# Patient Record
Sex: MALE | Race: WHITE | Employment: STUDENT | URBAN - METROPOLITAN AREA
[De-identification: names, ages, dates, MRNs, and addresses within clinical notes are randomized per-mention and may not be internally consistent; named-entity substitution may affect disease eponyms.]

---
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
## 2022-01-14 ENCOUNTER — OFFICE VISIT (OUTPATIENT)
Dept: PHYSICAL MEDICINE AND REHAB | Facility: CLINIC | Age: 22
End: 2022-01-14
Payer: COMMERCIAL

## 2022-01-14 VITALS
BODY MASS INDEX: 24.44 KG/M2 | HEIGHT: 69 IN | WEIGHT: 165 LBS | SYSTOLIC BLOOD PRESSURE: 110 MMHG | HEART RATE: 85 BPM | OXYGEN SATURATION: 98 % | DIASTOLIC BLOOD PRESSURE: 58 MMHG

## 2022-01-14 DIAGNOSIS — M22.2X1 RIGHT PATELLOFEMORAL SYNDROME: Primary | ICD-10-CM

## 2022-01-14 PROCEDURE — 3078F DIAST BP <80 MM HG: CPT | Performed by: PHYSICAL MEDICINE & REHABILITATION

## 2022-01-14 PROCEDURE — 3074F SYST BP LT 130 MM HG: CPT | Performed by: PHYSICAL MEDICINE & REHABILITATION

## 2022-01-14 PROCEDURE — 3008F BODY MASS INDEX DOCD: CPT | Performed by: PHYSICAL MEDICINE & REHABILITATION

## 2022-01-14 PROCEDURE — 99204 OFFICE O/P NEW MOD 45 MIN: CPT | Performed by: PHYSICAL MEDICINE & REHABILITATION

## 2022-01-14 NOTE — H&P
History and Physical    C/C: right knee pain    HPI: This is a 78-year-old male who presents with right anterior knee pain that began about 3 weeks ago without inciting events.   He particularly feels increased right anterior knee pain after sitting for abo negative  Lateral joint line tenderness: Negative  Medial patellar facet tenderness: Negative  Lateral patellar facet tenderness: Negative    Provocative tests:  Lachman: Negative  Valgus and varus stress testing: Negative    Imaging: No imaging to review

## 2022-01-26 ENCOUNTER — TELEPHONE (OUTPATIENT)
Dept: PHYSICAL THERAPY | Facility: HOSPITAL | Age: 22
End: 2022-01-26

## 2022-01-27 ENCOUNTER — OFFICE VISIT (OUTPATIENT)
Dept: PHYSICAL THERAPY | Age: 22
End: 2022-01-27
Attending: PHYSICAL MEDICINE & REHABILITATION
Payer: COMMERCIAL

## 2022-01-27 DIAGNOSIS — M22.2X1 RIGHT PATELLOFEMORAL SYNDROME: ICD-10-CM

## 2022-01-27 PROCEDURE — 97161 PT EVAL LOW COMPLEX 20 MIN: CPT

## 2022-01-27 PROCEDURE — 97110 THERAPEUTIC EXERCISES: CPT

## 2022-01-27 NOTE — PROGRESS NOTES
LOWER EXTREMITY EVALUATION:   Referring Physician: Dr. Ian Trejo  Diagnosis:     Right patellofemoral syndrome (M22.2X1)    Date of Service: 1/27/2022     PATIENT SUMMARY   Frankey Pall is a 25year old male who presents to therapy today with complain tests negative for pain. Craigs test + for B hip retroversion. He has decreased hip strength and control at the right hip allowing for excessive hip IR and adduction with knee valgus during double and single leg squatting which reproduces his knee pain.  Anny Hammond was also provided recommendations for correcting excessive R LE ER with gait. Chair transfers were assessed and corrected for excessive hip IR and adduction with knee valgus. Pt performed hip ABD SLR in neutral in side lying 5 reps 3 sets.   HO issued for c

## 2022-02-01 ENCOUNTER — OFFICE VISIT (OUTPATIENT)
Dept: PHYSICAL THERAPY | Age: 22
End: 2022-02-01
Attending: PHYSICAL MEDICINE & REHABILITATION
Payer: COMMERCIAL

## 2022-02-01 PROCEDURE — 97110 THERAPEUTIC EXERCISES: CPT

## 2022-02-03 ENCOUNTER — OFFICE VISIT (OUTPATIENT)
Dept: PHYSICAL THERAPY | Age: 22
End: 2022-02-03
Attending: PHYSICAL MEDICINE & REHABILITATION
Payer: COMMERCIAL

## 2022-02-03 PROCEDURE — 97140 MANUAL THERAPY 1/> REGIONS: CPT

## 2022-02-03 PROCEDURE — 97110 THERAPEUTIC EXERCISES: CPT

## 2022-02-07 ENCOUNTER — OFFICE VISIT (OUTPATIENT)
Dept: PHYSICAL THERAPY | Age: 22
End: 2022-02-07
Attending: PHYSICAL MEDICINE & REHABILITATION
Payer: COMMERCIAL

## 2022-02-07 PROCEDURE — 97110 THERAPEUTIC EXERCISES: CPT

## 2022-02-09 ENCOUNTER — OFFICE VISIT (OUTPATIENT)
Dept: PHYSICAL THERAPY | Age: 22
End: 2022-02-09
Attending: PHYSICAL MEDICINE & REHABILITATION
Payer: COMMERCIAL

## 2022-02-09 PROCEDURE — 97110 THERAPEUTIC EXERCISES: CPT

## 2022-02-15 ENCOUNTER — OFFICE VISIT (OUTPATIENT)
Dept: PHYSICAL THERAPY | Age: 22
End: 2022-02-15
Attending: PHYSICAL MEDICINE & REHABILITATION
Payer: COMMERCIAL

## 2022-02-15 PROCEDURE — 97110 THERAPEUTIC EXERCISES: CPT

## 2022-02-17 ENCOUNTER — OFFICE VISIT (OUTPATIENT)
Dept: PHYSICAL THERAPY | Age: 22
End: 2022-02-17
Attending: PHYSICAL MEDICINE & REHABILITATION
Payer: COMMERCIAL

## 2022-02-17 PROCEDURE — 97110 THERAPEUTIC EXERCISES: CPT

## 2022-02-18 ENCOUNTER — MED REC SCAN ONLY (OUTPATIENT)
Dept: PHYSICAL MEDICINE AND REHAB | Facility: CLINIC | Age: 22
End: 2022-02-18

## 2022-02-21 ENCOUNTER — OFFICE VISIT (OUTPATIENT)
Dept: PHYSICAL THERAPY | Age: 22
End: 2022-02-21
Attending: PHYSICAL MEDICINE & REHABILITATION
Payer: COMMERCIAL

## 2022-02-21 PROCEDURE — 97110 THERAPEUTIC EXERCISES: CPT

## 2022-03-01 ENCOUNTER — LAB ENCOUNTER (OUTPATIENT)
Dept: LAB | Age: 22
End: 2022-03-01
Attending: FAMILY MEDICINE
Payer: COMMERCIAL

## 2022-03-01 ENCOUNTER — OFFICE VISIT (OUTPATIENT)
Dept: FAMILY MEDICINE CLINIC | Facility: CLINIC | Age: 22
End: 2022-03-01
Payer: COMMERCIAL

## 2022-03-01 VITALS
WEIGHT: 170 LBS | RESPIRATION RATE: 18 BRPM | HEIGHT: 69 IN | HEART RATE: 65 BPM | OXYGEN SATURATION: 97 % | DIASTOLIC BLOOD PRESSURE: 66 MMHG | BODY MASS INDEX: 25.18 KG/M2 | SYSTOLIC BLOOD PRESSURE: 98 MMHG

## 2022-03-01 DIAGNOSIS — Z00.00 ROUTINE GENERAL MEDICAL EXAMINATION AT A HEALTH CARE FACILITY: ICD-10-CM

## 2022-03-01 DIAGNOSIS — Z00.00 ROUTINE GENERAL MEDICAL EXAMINATION AT A HEALTH CARE FACILITY: Primary | ICD-10-CM

## 2022-03-01 PROBLEM — K52.9 GASTROENTERITIS: Status: RESOLVED | Noted: 2019-05-09 | Resolved: 2022-03-01

## 2022-03-01 PROBLEM — L70.9 ACNE: Status: RESOLVED | Noted: 2017-12-06 | Resolved: 2022-03-01

## 2022-03-01 PROBLEM — F33.2 SEVERE EPISODE OF RECURRENT MAJOR DEPRESSIVE DISORDER, WITHOUT PSYCHOTIC FEATURES (HCC): Status: RESOLVED | Noted: 2017-12-05 | Resolved: 2022-03-01

## 2022-03-01 LAB
ALBUMIN SERPL-MCNC: 4.4 G/DL (ref 3.4–5)
ALBUMIN/GLOB SERPL: 1.6 {RATIO} (ref 1–2)
ALP LIVER SERPL-CCNC: 43 U/L
ALT SERPL-CCNC: 27 U/L
ANION GAP SERPL CALC-SCNC: 4 MMOL/L (ref 0–18)
AST SERPL-CCNC: 13 U/L (ref 15–37)
BILIRUB SERPL-MCNC: 0.4 MG/DL (ref 0.1–2)
BUN BLD-MCNC: 14 MG/DL (ref 7–18)
CALCIUM BLD-MCNC: 9.3 MG/DL (ref 8.5–10.1)
CHLORIDE SERPL-SCNC: 105 MMOL/L (ref 98–112)
CHOLEST SERPL-MCNC: 187 MG/DL (ref ?–200)
CO2 SERPL-SCNC: 30 MMOL/L (ref 21–32)
CREAT BLD-MCNC: 0.86 MG/DL
EST. AVERAGE GLUCOSE BLD GHB EST-MCNC: 100 MG/DL (ref 68–126)
FASTING PATIENT LIPID ANSWER: YES
FASTING STATUS PATIENT QL REPORTED: YES
GLOBULIN PLAS-MCNC: 2.8 G/DL (ref 2.8–4.4)
GLUCOSE BLD-MCNC: 73 MG/DL (ref 70–99)
HBA1C MFR BLD: 5.1 % (ref ?–5.7)
HDLC SERPL-MCNC: 54 MG/DL (ref 40–59)
LDLC SERPL CALC-MCNC: 106 MG/DL (ref ?–100)
NONHDLC SERPL-MCNC: 133 MG/DL (ref ?–130)
OSMOLALITY SERPL CALC.SUM OF ELEC: 287 MOSM/KG (ref 275–295)
POTASSIUM SERPL-SCNC: 4.2 MMOL/L (ref 3.5–5.1)
PROT SERPL-MCNC: 7.2 G/DL (ref 6.4–8.2)
SODIUM SERPL-SCNC: 139 MMOL/L (ref 136–145)
TRIGL SERPL-MCNC: 156 MG/DL (ref 30–149)
VIT B12 SERPL-MCNC: 728 PG/ML (ref 193–986)
VLDLC SERPL CALC-MCNC: 26 MG/DL (ref 0–30)

## 2022-03-01 PROCEDURE — 3074F SYST BP LT 130 MM HG: CPT | Performed by: FAMILY MEDICINE

## 2022-03-01 PROCEDURE — 80061 LIPID PANEL: CPT | Performed by: FAMILY MEDICINE

## 2022-03-01 PROCEDURE — 3008F BODY MASS INDEX DOCD: CPT | Performed by: FAMILY MEDICINE

## 2022-03-01 PROCEDURE — 80053 COMPREHEN METABOLIC PANEL: CPT | Performed by: FAMILY MEDICINE

## 2022-03-01 PROCEDURE — 82607 VITAMIN B-12: CPT | Performed by: FAMILY MEDICINE

## 2022-03-01 PROCEDURE — 99395 PREV VISIT EST AGE 18-39: CPT | Performed by: FAMILY MEDICINE

## 2022-03-01 PROCEDURE — 90715 TDAP VACCINE 7 YRS/> IM: CPT | Performed by: FAMILY MEDICINE

## 2022-03-01 PROCEDURE — 83036 HEMOGLOBIN GLYCOSYLATED A1C: CPT | Performed by: FAMILY MEDICINE

## 2022-03-01 PROCEDURE — 82306 VITAMIN D 25 HYDROXY: CPT | Performed by: FAMILY MEDICINE

## 2022-03-01 PROCEDURE — 3078F DIAST BP <80 MM HG: CPT | Performed by: FAMILY MEDICINE

## 2022-03-01 PROCEDURE — 90471 IMMUNIZATION ADMIN: CPT | Performed by: FAMILY MEDICINE

## 2022-03-01 NOTE — PATIENT INSTRUCTIONS
Patient information for tdap vaccine:  Tetanus bacteria causes lockjaw. You may be exposed by something terell entering the skin or soil entering the skin. Rare in the [place], 10-20 cases per year. Vaccine may still be effective up to 72 hours after an exposure. Diphtheria causes a sore throat with white spots similar to strep. Extremely rare in the [place] but you may be exposed if you travel over the borders. Pertussis or whooping cough is most dangerous to infants and toddlers. Still see 15,000 to 20,000 cases in the [place] annually. In adults this may cause a cough lingering up to 100 days, contagious the first few weeks. Antibiotics useful in children but not effective in adults. One vaccine covers these 3 illnesses and is good for 10 years. You are due in APril 2022, okay to dose anytime. Discussed self testicular exam to be done monthly. Highest risk of testicular cancer ages 25 to 27, but may occur ages 15 to 36. Report any changes such as hard nodules like a pebble on the testicle to us.

## 2022-03-02 LAB — VIT D+METAB SERPL-MCNC: 28.2 NG/ML (ref 30–100)

## 2022-03-03 ENCOUNTER — OFFICE VISIT (OUTPATIENT)
Dept: PHYSICAL THERAPY | Age: 22
End: 2022-03-03
Attending: PHYSICAL MEDICINE & REHABILITATION
Payer: COMMERCIAL

## 2022-03-03 PROCEDURE — 97110 THERAPEUTIC EXERCISES: CPT

## 2022-03-10 ENCOUNTER — OFFICE VISIT (OUTPATIENT)
Dept: PHYSICAL THERAPY | Age: 22
End: 2022-03-10
Attending: PHYSICAL MEDICINE & REHABILITATION
Payer: COMMERCIAL

## 2022-03-10 PROCEDURE — 97110 THERAPEUTIC EXERCISES: CPT

## 2022-03-17 ENCOUNTER — OFFICE VISIT (OUTPATIENT)
Dept: PHYSICAL THERAPY | Age: 22
End: 2022-03-17
Attending: PHYSICAL MEDICINE & REHABILITATION
Payer: COMMERCIAL

## 2022-03-17 PROCEDURE — 97110 THERAPEUTIC EXERCISES: CPT

## 2022-05-12 ENCOUNTER — LAB ENCOUNTER (OUTPATIENT)
Dept: LAB | Age: 22
End: 2022-05-12
Attending: ALLERGY & IMMUNOLOGY
Payer: COMMERCIAL

## 2022-05-12 DIAGNOSIS — L50.1 IDIOPATHIC URTICARIA: ICD-10-CM

## 2022-05-12 DIAGNOSIS — L50.3 DERMATOGRAPHIC URTICARIA: Primary | ICD-10-CM

## 2022-05-12 LAB
BASOPHILS # BLD AUTO: 0.06 X10(3) UL (ref 0–0.2)
BASOPHILS NFR BLD AUTO: 0.9 %
EOSINOPHIL # BLD AUTO: 0.32 X10(3) UL (ref 0–0.7)
EOSINOPHIL NFR BLD AUTO: 4.7 %
ERYTHROCYTE [DISTWIDTH] IN BLOOD BY AUTOMATED COUNT: 11.8 %
ERYTHROCYTE [SEDIMENTATION RATE] IN BLOOD: 5 MM/HR
HCT VFR BLD AUTO: 42.6 %
HGB BLD-MCNC: 14 G/DL
IGE SERPL-ACNC: 33.5 IU/ML (ref 3.6–114)
IMM GRANULOCYTES # BLD AUTO: 0.02 X10(3) UL (ref 0–1)
IMM GRANULOCYTES NFR BLD: 0.3 %
LYMPHOCYTES # BLD AUTO: 1.85 X10(3) UL (ref 1–4)
LYMPHOCYTES NFR BLD AUTO: 27.1 %
MCH RBC QN AUTO: 29.1 PG (ref 26–34)
MCHC RBC AUTO-ENTMCNC: 32.9 G/DL (ref 31–37)
MCV RBC AUTO: 88.6 FL
MONOCYTES # BLD AUTO: 0.55 X10(3) UL (ref 0.1–1)
MONOCYTES NFR BLD AUTO: 8.1 %
NEUTROPHILS # BLD AUTO: 4.03 X10 (3) UL (ref 1.5–7.7)
NEUTROPHILS # BLD AUTO: 4.03 X10(3) UL (ref 1.5–7.7)
NEUTROPHILS NFR BLD AUTO: 58.9 %
PLATELET # BLD AUTO: 317 10(3)UL (ref 150–450)
RBC # BLD AUTO: 4.81 X10(6)UL
WBC # BLD AUTO: 6.8 X10(3) UL (ref 4–11)

## 2022-05-12 PROCEDURE — 86003 ALLG SPEC IGE CRUDE XTRC EA: CPT

## 2022-05-12 PROCEDURE — 36415 COLL VENOUS BLD VENIPUNCTURE: CPT

## 2022-05-12 PROCEDURE — 85025 COMPLETE CBC W/AUTO DIFF WBC: CPT

## 2022-05-12 PROCEDURE — 82785 ASSAY OF IGE: CPT

## 2022-05-12 PROCEDURE — 85652 RBC SED RATE AUTOMATED: CPT

## 2022-05-15 LAB
ALLERGEN, AVOCADO IGE: <0.1 KU/L
ALLERGEN, MACADAMIA NUT IGE: <0.1 KU/L
ALLERGEN, PISTACHIO IGE: <0.1 KU/L
Lab: <0.1 KU/L

## 2022-05-16 LAB
ALMOND IGE QN: 0.17 KUA/L (ref ?–0.1)
CASHEW NUT IGE QN: <0.1 KUA/L (ref ?–0.1)
CORN IGE QN: 0.13 KUA/L (ref ?–0.1)
COW MILK IGE QN: <0.1 KUA/L (ref ?–0.1)
CRAB IGE QN: <0.1 KUA/L (ref ?–0.1)
EGG WHITE IGE QN: <0.1 KUA/L (ref ?–0.1)
EGG YOLK IGE QN: <0.1 KUA/L (ref ?–0.1)
GLUTEN IGE QN: <0.1 KUA/L (ref ?–0.1)
HAZELNUT IGE QN: 2.28 KUA/L (ref ?–0.1)
LOBSTER IGE QN: <0.1 KUA/L (ref ?–0.1)
PEANUT IGE QN: 0.21 KUA/L (ref ?–0.1)
PECAN/HICK NUT IGE QN: <0.1 KUA/L (ref ?–0.1)
SESAME SEED IGE QN: 0.59 KUA/L (ref ?–0.1)
SHRIMP IGE QN: <0.1 KUA/L (ref ?–0.1)
SOYBEAN IGE QN: 0.1 KUA/L (ref ?–0.1)
WALNUT IGE QN: <0.1 KUA/L (ref ?–0.1)
WHEAT IGE QN: 0.15 KUA/L (ref ?–0.1)

## 2022-10-29 ENCOUNTER — APPOINTMENT (OUTPATIENT)
Dept: ULTRASOUND IMAGING | Facility: HOSPITAL | Age: 22
End: 2022-10-29
Attending: EMERGENCY MEDICINE
Payer: COMMERCIAL

## 2022-10-29 ENCOUNTER — HOSPITAL ENCOUNTER (EMERGENCY)
Facility: HOSPITAL | Age: 22
Discharge: HOME | End: 2022-10-29
Attending: EMERGENCY MEDICINE
Payer: COMMERCIAL

## 2022-10-29 VITALS
SYSTOLIC BLOOD PRESSURE: 112 MMHG | BODY MASS INDEX: 25.77 KG/M2 | RESPIRATION RATE: 17 BRPM | OXYGEN SATURATION: 98 % | WEIGHT: 180 LBS | TEMPERATURE: 99 F | HEIGHT: 70 IN | DIASTOLIC BLOOD PRESSURE: 65 MMHG | HEART RATE: 68 BPM

## 2022-10-29 DIAGNOSIS — N50.812 TESTICULAR PAIN, LEFT: Primary | ICD-10-CM

## 2022-10-29 LAB
BILIRUB UR QL STRIP.AUTO: NEGATIVE
CLARITY UR REFRACT.AUTO: CLEAR
GLUCOSE UR STRIP.AUTO-MCNC: NEGATIVE MG/DL
KETONES UR STRIP.AUTO-MCNC: NEGATIVE MG/DL
LEUKOCYTE ESTERASE UR QL STRIP.AUTO: NEGATIVE
NITRITE UR QL STRIP.AUTO: NEGATIVE
PH UR STRIP.AUTO: 7 [PH] (ref 5–8)
PROT UR STRIP.AUTO-MCNC: NEGATIVE MG/DL
RBC UR QL AUTO: NEGATIVE
SP GR UR STRIP.AUTO: 1.01 (ref 1–1.03)
UROBILINOGEN UR STRIP.AUTO-MCNC: <2 MG/DL

## 2022-10-29 PROCEDURE — 99284 EMERGENCY DEPT VISIT MOD MDM: CPT

## 2022-10-29 PROCEDURE — 81003 URINALYSIS AUTO W/O SCOPE: CPT | Performed by: EMERGENCY MEDICINE

## 2022-10-29 PROCEDURE — 93975 VASCULAR STUDY: CPT | Performed by: EMERGENCY MEDICINE

## 2022-10-29 PROCEDURE — 76870 US EXAM SCROTUM: CPT | Performed by: EMERGENCY MEDICINE

## 2023-02-12 ENCOUNTER — TELEMEDICINE (OUTPATIENT)
Dept: TELEHEALTH | Age: 23
End: 2023-02-12

## 2023-02-12 DIAGNOSIS — U07.1 COVID-19 VIRUS INFECTION: ICD-10-CM

## 2023-02-12 DIAGNOSIS — H10.33 ACUTE CONJUNCTIVITIS OF BOTH EYES, UNSPECIFIED ACUTE CONJUNCTIVITIS TYPE: ICD-10-CM

## 2023-02-12 DIAGNOSIS — J01.90 ACUTE RHINOSINUSITIS: Primary | ICD-10-CM

## 2023-02-12 PROCEDURE — 99213 OFFICE O/P EST LOW 20 MIN: CPT | Performed by: NURSE PRACTITIONER

## 2023-02-12 RX ORDER — AMOXICILLIN AND CLAVULANATE POTASSIUM 875; 125 MG/1; MG/1
1 TABLET, FILM COATED ORAL 2 TIMES DAILY WITH MEALS
Qty: 14 TABLET | Refills: 0 | Status: SHIPPED | OUTPATIENT
Start: 2023-02-12 | End: 2023-02-19

## 2023-02-12 RX ORDER — ALBUTEROL SULFATE 90 UG/1
2 AEROSOL, METERED RESPIRATORY (INHALATION)
Qty: 1 EACH | Refills: 0 | Status: SHIPPED | OUTPATIENT
Start: 2023-02-12

## 2023-02-12 RX ORDER — DOXEPIN HYDROCHLORIDE 25 MG/1
1 CAPSULE ORAL NIGHTLY PRN
COMMUNITY
Start: 2022-12-11

## 2023-02-12 RX ORDER — OFLOXACIN 3 MG/ML
SOLUTION/ DROPS OPHTHALMIC
Qty: 1 EACH | Refills: 0 | Status: SHIPPED | OUTPATIENT
Start: 2023-02-12

## 2023-03-30 ENCOUNTER — OFFICE VISIT (OUTPATIENT)
Dept: FAMILY MEDICINE CLINIC | Facility: CLINIC | Age: 23
End: 2023-03-30
Payer: COMMERCIAL

## 2023-03-30 VITALS
OXYGEN SATURATION: 99 % | HEIGHT: 70 IN | HEART RATE: 60 BPM | TEMPERATURE: 99 F | WEIGHT: 186 LBS | DIASTOLIC BLOOD PRESSURE: 60 MMHG | BODY MASS INDEX: 26.63 KG/M2 | RESPIRATION RATE: 16 BRPM | SYSTOLIC BLOOD PRESSURE: 102 MMHG

## 2023-03-30 DIAGNOSIS — Z86.16 HISTORY OF COVID-19: ICD-10-CM

## 2023-03-30 DIAGNOSIS — R05.9 COUGH IN ADULT: Primary | ICD-10-CM

## 2023-03-30 DIAGNOSIS — R09.89 ERYTHEMATOUS NASAL MUCOSA: ICD-10-CM

## 2023-03-30 PROCEDURE — 3008F BODY MASS INDEX DOCD: CPT | Performed by: NURSE PRACTITIONER

## 2023-03-30 PROCEDURE — 3078F DIAST BP <80 MM HG: CPT | Performed by: NURSE PRACTITIONER

## 2023-03-30 PROCEDURE — 3074F SYST BP LT 130 MM HG: CPT | Performed by: NURSE PRACTITIONER

## 2023-03-30 PROCEDURE — 99213 OFFICE O/P EST LOW 20 MIN: CPT | Performed by: NURSE PRACTITIONER

## 2023-03-30 RX ORDER — TRETINOIN 1 MG/G
CREAM TOPICAL
COMMUNITY
Start: 2022-10-30

## 2023-03-30 RX ORDER — FLUTICASONE PROPIONATE 50 MCG
1 SPRAY, SUSPENSION (ML) NASAL 2 TIMES DAILY
Qty: 1 EACH | Refills: 2 | Status: SHIPPED | OUTPATIENT
Start: 2023-03-30 | End: 2023-04-29

## 2023-03-30 RX ORDER — CLINDAMYCIN PHOSPHATE 10 UG/ML
1 LOTION TOPICAL EVERY MORNING
COMMUNITY
Start: 2022-12-08

## 2023-03-30 RX ORDER — AZELAIC ACID 0.15 G/G
1 GEL TOPICAL 2 TIMES DAILY
COMMUNITY
Start: 2022-11-25

## 2023-03-30 RX ORDER — CETIRIZINE HYDROCHLORIDE 10 MG/1
10 TABLET ORAL DAILY
COMMUNITY

## 2023-03-30 NOTE — PATIENT INSTRUCTIONS
Please start Flonase 1 spray each nostril twice daily before brushing teeth. Use for at least one to two weeks. To switch off Zyrtec-take every other day alternating with Claritin or Allegra for one week, then take every third day alternating with medication for one week, then take every 4th day alternating with medication for one week, continue until only taking Claritin or Allegra. If itchy may take lukewarm shower or put cold pack on particularly itchy area. Itching will subside once Zyrtec is out of your system.

## 2023-05-12 ENCOUNTER — OFFICE VISIT (OUTPATIENT)
Dept: FAMILY MEDICINE CLINIC | Facility: CLINIC | Age: 23
End: 2023-05-12
Payer: COMMERCIAL

## 2023-05-12 ENCOUNTER — LAB ENCOUNTER (OUTPATIENT)
Dept: LAB | Age: 23
End: 2023-05-12
Attending: FAMILY MEDICINE
Payer: COMMERCIAL

## 2023-05-12 VITALS
SYSTOLIC BLOOD PRESSURE: 102 MMHG | HEIGHT: 70 IN | BODY MASS INDEX: 24.48 KG/M2 | OXYGEN SATURATION: 96 % | WEIGHT: 171 LBS | HEART RATE: 70 BPM | DIASTOLIC BLOOD PRESSURE: 52 MMHG

## 2023-05-12 DIAGNOSIS — Z00.00 ROUTINE GENERAL MEDICAL EXAMINATION AT A HEALTH CARE FACILITY: ICD-10-CM

## 2023-05-12 DIAGNOSIS — L90.6: ICD-10-CM

## 2023-05-12 DIAGNOSIS — G44.84 PRIMARY EXERTIONAL HEADACHE: ICD-10-CM

## 2023-05-12 DIAGNOSIS — Z00.00 ROUTINE GENERAL MEDICAL EXAMINATION AT A HEALTH CARE FACILITY: Primary | ICD-10-CM

## 2023-05-12 LAB
ALBUMIN SERPL-MCNC: 4.4 G/DL (ref 3.4–5)
ALBUMIN/GLOB SERPL: 1.4 {RATIO} (ref 1–2)
ALP LIVER SERPL-CCNC: 51 U/L
ALT SERPL-CCNC: 25 U/L
ANION GAP SERPL CALC-SCNC: 1 MMOL/L (ref 0–18)
AST SERPL-CCNC: 19 U/L (ref 15–37)
BASOPHILS # BLD AUTO: 0.04 X10(3) UL (ref 0–0.2)
BASOPHILS NFR BLD AUTO: 0.8 %
BILIRUB SERPL-MCNC: 0.4 MG/DL (ref 0.1–2)
BUN BLD-MCNC: 16 MG/DL (ref 7–18)
CALCIUM BLD-MCNC: 9.7 MG/DL (ref 8.5–10.1)
CHLORIDE SERPL-SCNC: 105 MMOL/L (ref 98–112)
CHOLEST SERPL-MCNC: 176 MG/DL (ref ?–200)
CO2 SERPL-SCNC: 30 MMOL/L (ref 21–32)
CREAT BLD-MCNC: 1.14 MG/DL
EOSINOPHIL # BLD AUTO: 0.15 X10(3) UL (ref 0–0.7)
EOSINOPHIL NFR BLD AUTO: 3 %
ERYTHROCYTE [DISTWIDTH] IN BLOOD BY AUTOMATED COUNT: 11.9 %
FASTING PATIENT LIPID ANSWER: YES
FASTING STATUS PATIENT QL REPORTED: YES
GFR SERPLBLD BASED ON 1.73 SQ M-ARVRAT: 93 ML/MIN/1.73M2 (ref 60–?)
GLOBULIN PLAS-MCNC: 3.1 G/DL (ref 2.8–4.4)
GLUCOSE BLD-MCNC: 85 MG/DL (ref 70–99)
HCT VFR BLD AUTO: 44.5 %
HDLC SERPL-MCNC: 61 MG/DL (ref 40–59)
HGB BLD-MCNC: 14.9 G/DL
IMM GRANULOCYTES # BLD AUTO: 0.01 X10(3) UL (ref 0–1)
IMM GRANULOCYTES NFR BLD: 0.2 %
LDLC SERPL CALC-MCNC: 102 MG/DL (ref ?–100)
LYMPHOCYTES # BLD AUTO: 1.93 X10(3) UL (ref 1–4)
LYMPHOCYTES NFR BLD AUTO: 38.3 %
MCH RBC QN AUTO: 28.7 PG (ref 26–34)
MCHC RBC AUTO-ENTMCNC: 33.5 G/DL (ref 31–37)
MCV RBC AUTO: 85.7 FL
MONOCYTES # BLD AUTO: 0.48 X10(3) UL (ref 0.1–1)
MONOCYTES NFR BLD AUTO: 9.5 %
NEUTROPHILS # BLD AUTO: 2.43 X10 (3) UL (ref 1.5–7.7)
NEUTROPHILS # BLD AUTO: 2.43 X10(3) UL (ref 1.5–7.7)
NEUTROPHILS NFR BLD AUTO: 48.2 %
NONHDLC SERPL-MCNC: 115 MG/DL (ref ?–130)
OSMOLALITY SERPL CALC.SUM OF ELEC: 282 MOSM/KG (ref 275–295)
PLATELET # BLD AUTO: 309 10(3)UL (ref 150–450)
POTASSIUM SERPL-SCNC: 3.8 MMOL/L (ref 3.5–5.1)
PROT SERPL-MCNC: 7.5 G/DL (ref 6.4–8.2)
RBC # BLD AUTO: 5.19 X10(6)UL
SODIUM SERPL-SCNC: 136 MMOL/L (ref 136–145)
TRIGL SERPL-MCNC: 67 MG/DL (ref 30–149)
VLDLC SERPL CALC-MCNC: 11 MG/DL (ref 0–30)
WBC # BLD AUTO: 5 X10(3) UL (ref 4–11)

## 2023-05-12 PROCEDURE — 3008F BODY MASS INDEX DOCD: CPT | Performed by: FAMILY MEDICINE

## 2023-05-12 PROCEDURE — 99395 PREV VISIT EST AGE 18-39: CPT | Performed by: FAMILY MEDICINE

## 2023-05-12 PROCEDURE — 3078F DIAST BP <80 MM HG: CPT | Performed by: FAMILY MEDICINE

## 2023-05-12 PROCEDURE — 80061 LIPID PANEL: CPT | Performed by: FAMILY MEDICINE

## 2023-05-12 PROCEDURE — 85025 COMPLETE CBC W/AUTO DIFF WBC: CPT | Performed by: FAMILY MEDICINE

## 2023-05-12 PROCEDURE — 80053 COMPREHEN METABOLIC PANEL: CPT | Performed by: FAMILY MEDICINE

## 2023-05-12 PROCEDURE — 3074F SYST BP LT 130 MM HG: CPT | Performed by: FAMILY MEDICINE

## 2023-05-12 RX ORDER — CITALOPRAM 10 MG/1
10 TABLET ORAL DAILY
COMMUNITY

## 2023-05-12 NOTE — PATIENT INSTRUCTIONS
VItamin E lotion with aloe for striae      Some studies suggest that certain supplements can help prevent exertional headaches, such as:    Coenzyme Q10. Feverfew. Magnesium. Riboflavin (vitamin B2). Boswellia (a natural anti-inflammatory if indomethacin is not well-tolerated).

## 2024-03-28 ENCOUNTER — OFFICE VISIT (OUTPATIENT)
Dept: FAMILY MEDICINE CLINIC | Facility: CLINIC | Age: 24
End: 2024-03-28
Payer: COMMERCIAL

## 2024-03-28 VITALS
BODY MASS INDEX: 24.91 KG/M2 | WEIGHT: 174 LBS | HEIGHT: 70 IN | SYSTOLIC BLOOD PRESSURE: 118 MMHG | TEMPERATURE: 98 F | DIASTOLIC BLOOD PRESSURE: 56 MMHG | OXYGEN SATURATION: 100 % | HEART RATE: 89 BPM | RESPIRATION RATE: 16 BRPM

## 2024-03-28 DIAGNOSIS — J00 NASOPHARYNGITIS: Primary | ICD-10-CM

## 2024-03-28 DIAGNOSIS — J34.89 NASAL SORE: ICD-10-CM

## 2024-03-28 LAB
CONTROL LINE PRESENT WITH A CLEAR BACKGROUND (YES/NO): YES YES/NO
STREP GRP A CUL-SCR: NEGATIVE

## 2024-03-28 PROCEDURE — 3078F DIAST BP <80 MM HG: CPT | Performed by: NURSE PRACTITIONER

## 2024-03-28 PROCEDURE — 87635 SARS-COV-2 COVID-19 AMP PRB: CPT | Performed by: NURSE PRACTITIONER

## 2024-03-28 PROCEDURE — 3074F SYST BP LT 130 MM HG: CPT | Performed by: NURSE PRACTITIONER

## 2024-03-28 PROCEDURE — 87880 STREP A ASSAY W/OPTIC: CPT | Performed by: NURSE PRACTITIONER

## 2024-03-28 PROCEDURE — 3008F BODY MASS INDEX DOCD: CPT | Performed by: NURSE PRACTITIONER

## 2024-03-28 PROCEDURE — 99213 OFFICE O/P EST LOW 20 MIN: CPT | Performed by: NURSE PRACTITIONER

## 2024-03-28 NOTE — PROGRESS NOTES
CHIEF COMPLAINT:     Chief Complaint   Patient presents with    Sore Throat     Sore throat last night, nasal congestion x 6 days ago had a lump in his neck        HPI:   Wil Hager is a 24 year old male presents to clinic with complaint of sore throat, drainage- nasal/post nasal, scabbing/tenderness in nostrils, lymph node in right neck, loose stools.  Drainage is clear.  Denies fever, body aches, chills, cough, dyspnea, SOB, chest pain, abdominal pain, N/V, ear pain, or rashes.  Tolerating PO.  No known ill contacts.  Denies recurrent strep, hx of tonsillectomy.      Current Outpatient Medications   Medication Sig Dispense Refill    NON FORMULARY Takes an antihistamine daily      mupirocin 2 % External Ointment Apply to affected area of nose BID-TID for 7 days 22 g 0    doxepin 25 MG Oral Cap Take 1 capsule (25 mg total) by mouth nightly as needed.      Ziprasidone HCl 40 MG Oral Cap Take 1 capsule (40 mg total) by mouth daily.      melatonin 3 MG Oral Tab Take nightly 30 tablet 0    Multiple Vitamin (MULTI-VITAMIN OR) Take  by mouth.      citalopram 10 MG Oral Tab Take 1 tablet (10 mg total) by mouth daily. (Patient not taking: Reported on 3/28/2024)      Tretinoin 0.1 % External Cream APPLY TO FACE NIGHTLY AT BEDDTIME, START EVERY OTHER NIGHT, THEN NIGHTLY AS TOLERATED (Patient not taking: Reported on 3/28/2024)      clindamycin 1 % External Lotion Apply 1 Application topically every morning. (Patient not taking: Reported on 3/28/2024)      albuterol 108 (90 Base) MCG/ACT Inhalation Aero Soln Inhale 2 puffs into the lungs every 4 to 6 hours as needed for Wheezing or Shortness of Breath. (Patient not taking: Reported on 3/28/2024) 1 each 0      Past Medical History:   Diagnosis Date    Acne 12/6/2017    Anxiety     Depression     Headache disorder       Social History:  Social History     Socioeconomic History    Marital status: Single   Tobacco Use    Smoking status: Never    Smokeless tobacco: Never     Tobacco comments:     vaping   Vaping Use    Vaping Use: Some days   Substance and Sexual Activity    Alcohol use: Not Currently     Alcohol/week: 0.0 standard drinks of alcohol     Comment: reports not drinking lately    Drug use: Not Currently     Comment: 10/15- Donovan states that he smokes 1-2 grams Cannabis 4-5 x a week x 2 years. Last use 10 days ago.PT unable to remember when his last use was, possibly 6 days ago . Thinks it may have been laced.        REVIEW OF SYSTEMS:   GENERAL HEALTH: feels well otherwise, normal appetite  SKIN: denies any unusual skin lesions or rashes  HEENT: denies ear pain or difficulty swallowing/eating. See HPI  RESPIRATORY: denies shortness of breath or wheezing  CARDIOVASCULAR: denies chest pain or palpitations   GI: denies vomiting or diarrhea  NEURO: denies dizziness or lightheadedness    EXAM:   /56   Pulse 89   Temp 98.3 °F (36.8 °C) (Oral)   Resp 16   Ht 5' 10\" (1.778 m)   Wt 174 lb (78.9 kg)   SpO2 100%   BMI 24.97 kg/m²   GENERAL: well developed, well nourished, in no apparent distress  SKIN: no rashes, no suspicious lesions  HEAD: atraumatic, normocephalic  EYES: conjunctiva clear, EOM intact  EARS: TM's clear, non-injected, no bulging, retraction, or fluid bilaterally  NOSE: nostrils patent, +clear drainage, nasal mucosa pink and +inflamed.  +Scabbed sore to left nare tip, tender.  THROAT: oral mucosa pink, moist. +Posterior pharynx erythematous and injected. No exudates. Tonsils- absent.  NECK: supple, non-tender  LUNGS: clear to auscultation bilaterally, no wheezes or rhonchi. Breathing is non labored. No cough.  CARDIO: RRR without murmur  LYMPH: No palpable lymphadenopathy.    Recent Results (from the past 24 hour(s))   Strep A Assay W/Optic    Collection Time: 03/28/24  2:45 PM   Result Value Ref Range    Strep Grp A Screen Negative Negative    Control Line Present with a clear background (yes/no) Yes Yes/No    Kit Lot # [id number] Numeric    Kit Expiration  Date 4/22/25 Date         ASSESSMENT AND PLAN:   Assessment:  Wil was seen today for sore throat.    Diagnoses and all orders for this visit:    Nasopharyngitis  -     COVID-19 Testing by PCR (Casandra) [E]; Future  -     Strep A Assay W/Optic  -     COVID-19 Testing by PCR (Casandra) [E]  -     mupirocin 2 % External Ointment; Apply to affected area of nose BID-TID for 7 days    Nasal sore  -     mupirocin 2 % External Ointment; Apply to affected area of nose BID-TID for 7 days          Plan:   - Negative rapid strep  - COVID PCR to lab  - Discussed that due to symptoms and negative rapid strep this is most likely viral and does not require antibiotics.   - Mupirocin to nasal sore.  - Comfort measures explained and discussed as listed in Patient Instructions.  - Advised follow up within 1 week if not improving, condition worsens, or new/persistent fevers.  - Pt verbalizes understanding and is agreeable w/ plan.    There are no Patient Instructions on file for this visit.

## 2024-03-29 LAB — SARS-COV-2 RNA RESP QL NAA+PROBE: NOT DETECTED

## 2024-06-05 ENCOUNTER — OFFICE VISIT (OUTPATIENT)
Dept: FAMILY MEDICINE CLINIC | Facility: CLINIC | Age: 24
End: 2024-06-05
Payer: COMMERCIAL

## 2024-06-05 VITALS
DIASTOLIC BLOOD PRESSURE: 60 MMHG | BODY MASS INDEX: 24.48 KG/M2 | HEIGHT: 70 IN | OXYGEN SATURATION: 97 % | RESPIRATION RATE: 17 BRPM | WEIGHT: 171 LBS | HEART RATE: 73 BPM | SYSTOLIC BLOOD PRESSURE: 100 MMHG

## 2024-06-05 DIAGNOSIS — Z00.00 ROUTINE GENERAL MEDICAL EXAMINATION AT A HEALTH CARE FACILITY: Primary | ICD-10-CM

## 2024-06-05 PROCEDURE — 3008F BODY MASS INDEX DOCD: CPT | Performed by: FAMILY MEDICINE

## 2024-06-05 PROCEDURE — 3078F DIAST BP <80 MM HG: CPT | Performed by: FAMILY MEDICINE

## 2024-06-05 PROCEDURE — 3074F SYST BP LT 130 MM HG: CPT | Performed by: FAMILY MEDICINE

## 2024-06-05 PROCEDURE — 99395 PREV VISIT EST AGE 18-39: CPT | Performed by: FAMILY MEDICINE

## 2024-06-05 RX ORDER — CLONIDINE HYDROCHLORIDE 0.1 MG/1
1 TABLET, EXTENDED RELEASE ORAL NIGHTLY
COMMUNITY
Start: 2024-04-19

## 2024-06-05 RX ORDER — ATOMOXETINE 10 MG/1
CAPSULE ORAL
COMMUNITY
Start: 2023-04-13

## 2024-06-05 NOTE — PROGRESS NOTES
HPI:  Here for a physical.    PAST MEDICAL HISTORY:  Past Medical History:    Acne    Anxiety    Depression    Headache disorder     PAST SURGICAL HISTORY:  Past Surgical History:   Procedure Laterality Date    Hc implant ear tubes      Tonsillectomy      approx age of 6     MEDICATIONS:  Current Outpatient Medications   Medication Sig Dispense Refill    atomoxetine 10 MG Oral Cap       cloNIDine HCl ER 0.1 MG Oral Tablet 12 Hr Take 1 tablet (0.1 mg total) by mouth nightly.      NON FORMULARY Takes an antihistamine daily      citalopram 10 MG Oral Tab Take 1 tablet (10 mg total) by mouth daily.      doxepin 25 MG Oral Cap Take 1 capsule (25 mg total) by mouth nightly as needed.      albuterol 108 (90 Base) MCG/ACT Inhalation Aero Soln Inhale 2 puffs into the lungs every 4 to 6 hours as needed for Wheezing or Shortness of Breath. 1 each 0    Ziprasidone HCl 40 MG Oral Cap Take 1 capsule (40 mg total) by mouth daily.      Multiple Vitamin (MULTI-VITAMIN OR) Take  by mouth.       ALLERGIES: Seasonal and Tea (triethanolamine) [trolamine]    Family History   Problem Relation Age of Onset    Diabetes Father     Cancer Maternal Grandfather         kidney cancer    Thyroid Disorder Paternal Grandmother     Heart Disorder Paternal Grandfather         pacemaker    Depression Paternal Grandfather     Diabetes Paternal Uncle        Social History     Socioeconomic History    Marital status: Single     Spouse name: Not on file    Number of children: Not on file    Years of education: Not on file    Highest education level: Not on file   Occupational History    Not on file   Tobacco Use    Smoking status: Never    Smokeless tobacco: Never    Tobacco comments:     vaping   Vaping Use    Vaping status: Some Days    Devices: Disposable   Substance and Sexual Activity    Alcohol use: Not Currently     Alcohol/week: 0.0 standard drinks of alcohol     Comment: reports not drinking lately    Drug use: Not Currently     Comment: 10/15-  Donovan states that he smokes 1-2 grams Cannabis 4-5 x a week x 2 years. Last use 10 days ago.PT unable to remember when his last use was, possibly 6 days ago . Thinks it may have been laced.    Sexual activity: Not on file   Other Topics Concern    Caffeine Concern Not Asked    Exercise Not Asked    Seat Belt Not Asked    Special Diet Not Asked    Stress Concern Not Asked    Weight Concern Not Asked   Social History Narrative    Not on file     Social Determinants of Health     Financial Resource Strain: Not on file   Food Insecurity: Not on file   Transportation Needs: Not on file   Physical Activity: Not on file   Stress: Not on file   Social Connections: Not on file   Housing Stability: Not on file       REVIEW OF SYSTEMS:  GENERAL: Feeling generally well.  EYES: No complaints of diplopia or blurred vision.  EARS: No complaints of tinnitus or decreased hearing acuity.  CARDIOVASCULAR: No complaints of chest pain with exertion, no PND, orthopnea, or edema. No decrease in exercise tolerance.  PULMONARY: No complaints of extreme shortness of breath with activity. No complaints of wheezing. No complaints of  hemoptysis.  GASTROINTESTINAL:No complaints of GERD symptoms. Denies hematochezia and melena. No complaints of  any new constipation or diarrhea.  No complaints of abdominal pain or cramping. Regular stools.  GENITOURINARY: No complaints of dysuria, urgency or frequency  MUSCULOSKELETAL: No complaints of arthralgias or myalgias.  SKIN: no new or changing moles reported. no rash.    EXAM:  /60   Pulse 73   Resp 17   Ht 5' 10\" (1.778 m)   Wt 171 lb (77.6 kg)   SpO2 97%   BMI 24.54 kg/m²   NAD  GENERAL: well developed, well nourished, in no apparent distress.  EARS: Bilateral pinna / tragus are WNL in appearance, External canals patent and without inflammation. Bilateral tympanic membranes pearly white. No effusions noted. Hearing grossly normal.  EYES: PERRLA, EOMI, bilateral sclera anicteric,  non-injected. Conjunctiva pink.  NOSE: Mucosa appears healthy.   OROPHARYNX: Mucosa moist without lesions. Dentition intact and gums appear healthy.  NECK: Supple, No lymphadenopathy. No thyromegaly or lesions palpated.  CARDIOVASCULAR: RRR, NL S1 and S2, no murmurs. Bilateral carotids without bruit. No abdominal bruits auscultated. Bilateral dorsalis pedis and posterior tibial pulses 2+/4+. No edema of the bilateral lower extremities. No JVD noted.  PULMONARY: CTA bilaterally, good air exchange, no rhonchi, wheezes, or rales. No dullness to percussion.  ABDOMEN: Soft, nontender, nondistended, NABS x 4 quadrants. No HSM; no masses; no bruits.   GENITOURINARY: Scrotum and testes without lesions, no hernias. Penis shaft and glans without lesions, no discharge.  LYMPHATIC: no lymphadenopathy palpated about the anterior and posterior cervical chains, submandibular and supraclavicular areas, axilla, and inguinal areas.  EXTREMITIES / MUSCULOSKELETAL: 4 extremities with grossly normal ROM. Gait NL. No cyanosis, clubbing or edema.  NEUROLOGIC: CN's II-XII grossly intact, DTR's 2+/4+ throughout. Strength 5+/5+ x 4 ext. Alert and orientated.  SKIN: no suspicions lesions noted.  PSYCHIATRIC: Mood and affect appropriate.        ASSESSMENT / PLAN:  Routine health maintenance.  Labs ordered:none  Administer Tetanus booster : 2022.  RHM information discussed: monthly manuel  Additional issues: Multiple mental health issues followed by psychiatry      Patient understood above plan and agreed to do labs within the next 30 days as well as to make all appointments for referrals if given within the next 30 days. Patient understands to contact office if unable to do so.

## 2025-02-14 ENCOUNTER — HOSPITAL ENCOUNTER (OUTPATIENT)
Age: 25
Discharge: HOME | End: 2025-02-14
Payer: COMMERCIAL

## 2025-02-14 VITALS
DIASTOLIC BLOOD PRESSURE: 73 MMHG | RESPIRATION RATE: 18 BRPM | OXYGEN SATURATION: 99 % | HEART RATE: 88 BPM | SYSTOLIC BLOOD PRESSURE: 111 MMHG | TEMPERATURE: 98 F

## 2025-02-14 DIAGNOSIS — R05.8 POST-VIRAL COUGH SYNDROME: Primary | ICD-10-CM

## 2025-02-14 RX ORDER — ALBUTEROL SULFATE 90 UG/1
2 INHALANT RESPIRATORY (INHALATION) EVERY 4 HOURS PRN
Qty: 1 EACH | Refills: 0 | Status: SHIPPED | OUTPATIENT
Start: 2025-02-14 | End: 2025-03-16

## 2025-02-14 RX ORDER — DEXAMETHASONE SODIUM PHOSPHATE 10 MG/ML
10 INJECTION, SOLUTION INTRAMUSCULAR; INTRAVENOUS ONCE
Status: COMPLETED | OUTPATIENT
Start: 2025-02-14 | End: 2025-02-14

## 2025-02-14 NOTE — ED PROVIDER NOTES
Patient Seen in: [facility]      History     Chief Complaint   Patient presents with    Cough/URI     Stated Complaint: cough    Subjective:   HPI      Patient is a healthy 25-year-old male who presents to immediate care due to cough x 1 month.  Patient states he was diagnosed with COVID-19 1 month ago.  Patient states that symptoms have been improving including fever congestion and cough however notes persistent mild nonproductive cough.  Does not wake him up at night.  Denying chest pain shortness of breath wheezing or acute fever.  Has been using  inhaler at home with no relief.    Objective:     Past Medical History:    Acne    Anxiety    Depression    Headache disorder              Past Surgical History:   Procedure Laterality Date    Hc implant ear tubes      Tonsillectomy      approx age of 6                Social History     Socioeconomic History    Marital status: Single   Tobacco Use    Smoking status: Never    Smokeless tobacco: Never    Tobacco comments:     vaping   Vaping Use    Vaping status: Some Days    Devices: Disposable   Substance and Sexual Activity    Alcohol use: Not Currently     Alcohol/week: 0.0 standard drinks of alcohol     Comment: reports not drinking lately    Drug use: Not Currently     Comment: 10/15- Donovan states that he smokes 1-2 grams Cannabis 4-5 x a week x 2 years. Last use 10 days ago.PT unable to remember when his last use was, possibly 6 days ago . Thinks it may have been laced.              Review of Systems    Positive for stated complaint: cough  Other systems are as noted in HPI.  Constitutional and vital signs reviewed.      All other systems reviewed and negative except as noted above.    Physical Exam     ED Triage Vitals [25 1129]   /73   Pulse 88   Resp 18   Temp 98.1 °F (36.7 °C)   Temp src Oral   SpO2 99 %   O2 Device None (Room air)       Current Vitals:   Vital Signs  BP: 111/73  Pulse: 88  Resp: 18  Temp: 98.1 °F (36.7  °C)  Temp src: Oral    Oxygen Therapy  SpO2: 99 %  O2 Device: None (Room air)        Physical Exam  Vital signs reviewed. Nursing note reviewed.  Constitutional: Well-developed. Well-nourished. In no acute distress  HENT: Mucous membranes moist. TMs intact bilaterally. No trismus. Uvula midline. Mild posterior pharynx erythema.  No petechiae, exudates, or posterior pharynx edema.  EYES: No scleral icterus or conjunctival injection.  NECK: Full ROM. Supple.   CARDIAC: Normal rate. Normal S1/ S2. 2+ distal pulses. No edema  PULM/CHEST: Clear to auscultation bilaterally. No wheezes  Extremities: Full ROM  NEURO: Awake, alert, following commands, moving extremities, answering questions.   SKIN: Warm and dry. No rash or lesions.  PSYCH: Normal judgment. Normal affect.             MDM      Patient is a healthy 25-year-old male who presents to immediate care due to cough x 1 month.  Patient arrives with stable vitals speaking complete sentences in no respiratory distress.  Physical exam unremarkable with lungs clear to auscultation.  Most likely chronic cough, postviral cough syndrome.  Consider chest x-ray however patient declined at this time, unlikely pneumonia with lungs clear to auscultation O2 saturation 99%.  Will give dose of Decadron prior to discharge.  Will additionally refill patient's albuterol inhaler.  Discussed supportive treatment including Tylenol and ibuprofen as needed.  Antihistamine such as Claritin or Zyrtec and decongestant such as Sudafed.  Return precautions including worsening cough, fever chest pain shortness of breath.  History given by patient.  Patient agreeable to plan all questions answered.          Medical Decision Making      Disposition and Plan     Clinical Impression:  1. Post-viral cough syndrome         Disposition:  Discharge  2/14/2025 11:49 am    Follow-up:  Alon Coles DO  [address] [place] [address]  [address] [place] [id number] [phone]    Call             Medications  Prescribed:  Current Discharge Medication List        START taking these medications    Details   !! albuterol 108 (90 Base) MCG/ACT Inhalation Aero Soln Inhale 2 puffs into the lungs every 4 (four) hours as needed for Wheezing.  Qty: 1 each, Refills: 0    Associated Diagnoses: Post-viral cough syndrome       !! - Potential duplicate medications found. Please discuss with provider.              Supplementary Documentation:

## 2025-02-20 ENCOUNTER — EKG ENCOUNTER (OUTPATIENT)
Dept: LAB | Age: 25
End: 2025-02-20
Attending: FAMILY MEDICINE
Payer: COMMERCIAL

## 2025-02-20 DIAGNOSIS — T50.905S LATE EFFECT OF ADVERSE EFFECT OF DRUG, MEDICAL OR BIOLOGICAL SUBSTANCE: Primary | ICD-10-CM

## 2025-02-20 DIAGNOSIS — F90.9 ATTENTION DEFICIT HYPERACTIVITY DISORDER: ICD-10-CM

## 2025-02-20 PROCEDURE — 93010 ELECTROCARDIOGRAM REPORT: CPT | Performed by: INTERNAL MEDICINE

## 2025-02-20 PROCEDURE — 93005 ELECTROCARDIOGRAM TRACING: CPT

## 2025-02-21 LAB
ATRIAL RATE: 77 BPM
P AXIS: 69 DEGREES
P-R INTERVAL: 154 MS
Q-T INTERVAL: 368 MS
QRS DURATION: 80 MS
QTC CALCULATION (BEZET): 416 MS
R AXIS: 77 DEGREES
T AXIS: 62 DEGREES
VENTRICULAR RATE: 77 BPM

## (undated) NOTE — MR AVS SNAPSHOT
[address]  [address] [facility], [address] [address] [phone]               Thank you for choosing us for your health care visit with Zachariah Sibley MD.  We are glad to serve you and happy to provide you with this Weight BMI    140 lb (53 %*, Z = 0.07) 20.67 kg/m2 (48 %*, Z = -0.06)    *Growth percentiles are based on CDC 2-20 Years data     BP percentiles are based on 2000 NHANES data         Current Medications          This list is accurate as of: 6/23/17  4:06

## (undated) NOTE — LETTER
Date: 5/9/2019    Patient Name: Pura Mccauley          To Whom it may concern: This letter has been written at the patient's request. The [facility] medical [facility].     This patient car

## (undated) NOTE — MR AVS SNAPSHOT
[address]  [address] [facility], [address] [id number] [phone]               Thank you for choosing us for your health care visit with Gianni Hutchinson.   We are glad to serve you and happy to provide you with this These medications were sent to [facility]Y #[id number]- [place] [facility] [address]  [phone], [place] [address]     Phone:  [phone]    - ondansetron 4 MG Tbdp            MyChart     Sign up for [facility]

## (undated) NOTE — MR AVS SNAPSHOT
[address]  [address] [address] [phone]               Thank you for choosing us for your health care visit with Yves Witt MD.  We are glad to serve you and happy to provide you with this Phone:  [phone]    - Citalopram Hydrobromide 20 MG Tabs            [employer]     Sign up for MyChart access for your child.   [employer] access allows you to view health information for your child from their recent   visit, view other health information a

## (undated) NOTE — Clinical Note
Date: 1/23/2017    Patient Name: Irene Espinoza    To Whom it may concern:    Brigida Witt has a left posterior lateral rib contusion of the seventh rib. I advised it would be okay to continue with wrestling practice.   I suggest keeping prior to practice wit

## (undated) NOTE — LETTER
Date: 12/4/2017    Patient Name: Abran Alejandre          To Whom it may concern: This letter has been written at the patient's request. The above patient was seen at the [facility] for treatment of a medical condition.     Wicho Yu should

## (undated) NOTE — LETTER
[address] [place] [address]   Date:   1/14/2022     Name:   Sejal Saha    YOB: 2000   MRN:   [id number]       WHERE IS YOUR PAIN NOW?   Beth Kaplan the areas on your body where you feel the described

## (undated) NOTE — LETTER
Date: 7/15/2018    Patient Name: Li Ugalde          To Whom it may concern:     The above patient was seen on 7/15/18 and received a meningitis vaccine         Sincerely,    Iliana Rodriguez PA-C

## (undated) NOTE — LETTER
Patient Name: John Olmstead  YOB: 2000          MRN number:  [id number]  Date:  1/27/2022  Referring Physician:  Jenna Terrell         LOWER EXTREMITY EVALUATION:   Referring Physician: Dr. Marichuy Forte  Diagnosis:     Right patellofemoral synd with stair ambulation, prolonged standing or walking, squatting. The results of the objective tests and measures show full knee ROM, specialty tests negative for pain. Craigs test + for B hip retroversion.   He has decreased hip strength and control a each    Today’s Treatment and Response:   Pt education was provided on exam findings, treatment diagnosis, treatment plan, expectations, and prognosis. Pt was also provided recommendations for correcting excessive R LE ER with gait.  Chair transfers were as

## (undated) NOTE — Clinical Note
Date: 3/16/2017    Patient Name: Kayla Smith          To Whom it may concern: This letter has been written at the patient's request. The above patient was seen at the [facility] for treatment of a medical condition.     This patient sh

## (undated) NOTE — ED AVS SNAPSHOT
Paramjit Neely   MRN: [id number]    Department:  [facility] Emergency Department   Date of Visit:  10/12/2019           Disclosure     Insurance plans vary and the physician(s) referred by the ER may not be covered by your plan.  Please contact y tell this physician (or your personal doctor if your instructions are to return to your personal doctor) about any new or lasting problems. The primary care or specialist physician will see patients referred from the [facility] Emergency Department.  Arthur Stone

## (undated) NOTE — LETTER
Date: 2/28/2019    Patient Name: Kayla Smith          To Whom it may concern: This letter has been written at the patient's request. The above patient was seen at the [facility] for treatment of a medical condition.     This patient sh

## (undated) NOTE — MR AVS SNAPSHOT
[address]  [address] [address] [phone]               Thank you for choosing us for your health care visit with Minnie Chow MD.  We are glad to serve you and happy to provide you with this provider. Sometimes a stronger pain medicine may be needed. A nerve block can be done in case of severe pain. It will numb the nerve between the ribs. Follow-up care  Follow up with your healthcare provider, or as advised.   If X-rays were taken, you will [facility]hart     Sign up for My[employer]er access for your child. MyTrainer access allows you to view health information for your child from their recent   visit, view other health information and more.   To sign up or find more information on getting   Proxy Access to In addition to 5, 4, 3, 2, 1 families can make small changes in their family routines to help everyone lead healthier active lives.  Try:  o Eating breakfast everyday  o Eating low-fat dairy products like yogurt, milk, and cheese  o Regularly eating meals t